# Patient Record
Sex: MALE | Race: WHITE
[De-identification: names, ages, dates, MRNs, and addresses within clinical notes are randomized per-mention and may not be internally consistent; named-entity substitution may affect disease eponyms.]

---

## 2018-02-08 ENCOUNTER — HOSPITAL ENCOUNTER (EMERGENCY)
Dept: HOSPITAL 63 - ER | Age: 63
Discharge: HOME | End: 2018-02-08
Payer: COMMERCIAL

## 2018-02-08 VITALS — BODY MASS INDEX: 31.83 KG/M2 | HEIGHT: 68 IN | WEIGHT: 210 LBS

## 2018-02-08 VITALS — SYSTOLIC BLOOD PRESSURE: 138 MMHG | DIASTOLIC BLOOD PRESSURE: 74 MMHG
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
## 2018-02-08 DIAGNOSIS — F17.210: ICD-10-CM

## 2018-02-08 DIAGNOSIS — M23.91: ICD-10-CM

## 2018-02-08 DIAGNOSIS — E11.9: ICD-10-CM
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
## 2018-02-08 DIAGNOSIS — S89.91XA: Primary | ICD-10-CM

## 2018-02-08 PROCEDURE — 73562 X-RAY EXAM OF KNEE 3: CPT

## 2018-02-08 PROCEDURE — 29505 APPLICATION LONG LEG SPLINT: CPT

## 2018-02-08 PROCEDURE — 96372 THER/PROPH/DIAG INJ SC/IM: CPT

## 2018-02-08 PROCEDURE — 99284 EMERGENCY DEPT VISIT MOD MDM: CPT

## 2018-02-08 NOTE — RAD
Right knee, 3 views, 2/8/2018:



History: Fall, knee pain



No fracture or dislocation is identified.  There is minimal posterior patellar

spurring.  No definite joint effusion is seen.



IMPRESSION: No acute bony abnormality is detected.

## 2018-03-16 ENCOUNTER — HOSPITAL ENCOUNTER (OUTPATIENT)
Dept: HOSPITAL 61 - KCIC MRI | Age: 63
Discharge: HOME | End: 2018-03-16
Attending: GENERAL PRACTICE
Payer: COMMERCIAL

## 2018-03-16 DIAGNOSIS — M22.41: ICD-10-CM
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
## 2018-03-16 DIAGNOSIS — M17.11: ICD-10-CM

## 2018-03-16 DIAGNOSIS — M67.461: ICD-10-CM
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
## 2018-03-16 DIAGNOSIS — M25.461: ICD-10-CM
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
## 2018-03-16 DIAGNOSIS — S83.411A: Primary | ICD-10-CM

## 2018-03-16 PROCEDURE — 73721 MRI JNT OF LWR EXTRE W/O DYE: CPT

## 2018-10-07 ENCOUNTER — HOSPITAL ENCOUNTER (EMERGENCY)
Dept: HOSPITAL 63 - ER | Age: 63
Discharge: HOME | End: 2018-10-07
Payer: COMMERCIAL

## 2018-10-07 VITALS
SYSTOLIC BLOOD PRESSURE: 120 MMHG | DIASTOLIC BLOOD PRESSURE: 75 MMHG | DIASTOLIC BLOOD PRESSURE: 75 MMHG | SYSTOLIC BLOOD PRESSURE: 120 MMHG | DIASTOLIC BLOOD PRESSURE: 75 MMHG | SYSTOLIC BLOOD PRESSURE: 120 MMHG | DIASTOLIC BLOOD PRESSURE: 75 MMHG | SYSTOLIC BLOOD PRESSURE: 120 MMHG

## 2018-10-07 VITALS — HEIGHT: 68 IN | WEIGHT: 210 LBS | BODY MASS INDEX: 31.83 KG/M2

## 2018-10-07 DIAGNOSIS — S23.3XXA: Primary | ICD-10-CM

## 2018-10-07 DIAGNOSIS — E11.9: ICD-10-CM

## 2018-10-07 DIAGNOSIS — S30.0XXA: ICD-10-CM

## 2018-10-07 DIAGNOSIS — Z71.6: ICD-10-CM
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
## 2018-10-07 DIAGNOSIS — S43.401A: ICD-10-CM
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
## 2018-10-07 DIAGNOSIS — M41.84: ICD-10-CM

## 2018-10-07 DIAGNOSIS — M19.011: ICD-10-CM

## 2018-10-07 DIAGNOSIS — S63.501A: ICD-10-CM

## 2018-10-07 DIAGNOSIS — F17.210: ICD-10-CM
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
## 2018-10-07 PROCEDURE — 72072 X-RAY EXAM THORAC SPINE 3VWS: CPT

## 2018-10-07 PROCEDURE — 72125 CT NECK SPINE W/O DYE: CPT

## 2018-10-07 PROCEDURE — 73030 X-RAY EXAM OF SHOULDER: CPT

## 2018-10-07 PROCEDURE — 90715 TDAP VACCINE 7 YRS/> IM: CPT

## 2018-10-07 PROCEDURE — 70450 CT HEAD/BRAIN W/O DYE: CPT

## 2018-10-07 PROCEDURE — 90471 IMMUNIZATION ADMIN: CPT

## 2018-10-07 PROCEDURE — 73110 X-RAY EXAM OF WRIST: CPT

## 2018-10-07 NOTE — RAD
EXAM: Thoracic spine, 3 views.

 

HISTORY: Fall.

 

COMPARISON: None.

 

FINDINGS: Frontal, lateral and swimmer's views of the thoracic spine are 

obtained. There is mild S-shaped thoracic scoliosis. There is no 

listhesis. The vertebral bodies are normal in height and the disc spaces 

are preserved.

 

IMPRESSION: 

1. Mild S-shaped thoracic scoliosis.

2. No acute osseous finding.

 

Electronically signed by: Katie Beasley MD (10/7/2018 12:07 PM) [facility]

## 2018-10-07 NOTE — RAD
EXAM: WRIST 3V RIGHT. 

 

HISTORY: Fall with right wrist pain.

 

COMPARISON: None.

 

FINDINGS: The first metacarpal is shortened, likely developmentally. The 

trapezoid and trapezium appear to been resected. A lucency within the 

distal radius suggests a small intraosseous cyst or prominent vascular 

channel and measures 7 mm. This appears benign. No fractures are 

identified.

 

IMPRESSION: 

1. No fracture or malalignment.

2. Resection of the trapezium and trapezoid. 

 

Electronically signed by: GIRISH Rodriguez MD (10/7/2018 12:09 PM) 

[facility]

## 2018-10-07 NOTE — RAD
EXAM: Right shoulder, 3 views.

 

HISTORY: Fall.

 

COMPARISON: None.

 

FINDINGS: 3 views of the right shoulder obtained. There is no fracture, 

dislocation or subluxation. There are inferiorly directed 

acromioclavicular spurs.

 

IMPRESSION: 

1. No acute osseous finding.

2. 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

 

Electronically signed by: Katie Beasley MD (10/7/2018 10:39 AM) [facility]

## 2018-10-07 NOTE — PHYS DOC
Past History


Past Medical History:  Diabetes


Past Surgical History:  No Surgical History


Smoking:  Cigarettes, Less than 1pk/day


Alcohol Use:  Occasionally


Drug Use:  None





Adult General


Chief Complaint


Chief Complaint:  MECHANICAL FALL





HPI


HPI





Patient is a 62 year old male who presents with complaining of injury to right 

upper extremity and back. Patient is a  and tried to prevent of 

escaping of a inmate with dementia that end up to falling on his right side 

with falling to inmate on top of him. Patient denies loss of consciousness and 

complaining of pain in his back and right shoulder and right wrist with 

abrasion of his back and right hand and rated his pain 8/10. Patient is not up-

to-date with tetanus immunization.





Review of Systems


Review of Systems





Constitutional: Denies fever or chills []


Eyes: Denies change in visual acuity, redness, or eye pain []


HENT: Denies nasal congestion or sore throat []


Respiratory: Denies cough or shortness of breath []


Cardiovascular: No additional information not addressed in HPI []


GI: Denies abdominal pain, nausea, vomiting, bloody stools or diarrhea []


: Denies dysuria or hematuria []


Musculoskeletal: Denies back pain or joint pain []


Integument: Denies rash or skin lesions []


Neurologic: Denies headache, focal weakness or sensory changes []


Endocrine: Denies polyuria or polydipsia []





All other systems were reviewed and found to be within normal limits, except as 

documented in this note.





Current Medications


Current Medications





Current Medications








 Medications


  (Trade)  Dose


 Ordered  Sig/Iwona  Start Time


 Stop Time Status Last Admin


Dose Admin


 


 Acetaminophen/


 Hydrocodone Bitart


  (Lortab 5/325)  1 tab  1X  ONCE  10/7/18 10:45


 10/7/18 10:46 DC  





 


 Diphtheria/


 Tetanus/Acell


 Pertussis


  (Boostrix)  0.5 ml  ONCE ONCE  10/7/18 10:15


 10/7/18 10:28 DC  














Allergies


Allergies





Allergies








Coded Allergies Type Severity Reaction Last Updated Verified


 


  No Known Drug Allergies    18 No











Physical Exam


Physical Exam





Constitutional: Well developed, well nourished, mild distress, non-toxic 

appearance. []


HENT: Normocephalic, atraumatic, oropharynx moist, no oral exudates, nose 

normal. []


Eyes: PERRLA, EOMI, conjunctiva normal, no discharge. [] 


Neck: Normal range of motion, no tenderness, supple, no stridor. [] 


Cardiovascular:Heart rate regular rhythm, no murmur []


Lungs & Thorax:  Bilateral breath sounds clear to auscultation []


Abdomen: Bowel sounds normal, soft, no tenderness, no masses, no pulsatile 

masses. [] 


Skin: Warm, dry, no erythema, no rash. [] 


Back: No tenderness, no CVA tenderness, several areas of abrasion in the lower 

thoracic spine without midline tenderness. [] 


Extremities: Right hand abrasion, right his tenderness, right shoulder limited 

range of motion of abduction secondary to pain, no cyanosis, no clubbing, no 

edema. [] 


Neurologic: Alert and oriented X 3, normal motor function, normal sensory 

function, no focal deficits noted. []


Psychologic: Affect normal, judgement normal, mood normal. []





Current Patient Data


Vital Signs





 Vital Signs








  Date Time  Temp Pulse Resp B/P (MAP) Pulse Ox O2 Delivery O2 Flow Rate FiO2


 


10/7/18 10:06 98.2 110 18  98 Room Air  











EKG


EKG


[]





Radiology/Procedures


Radiology/Procedures


 [facility] [address], [place] [address]


 [phone]


 


 IMAGING REPORT





 Signed





PATIENT: ELIER TEE ACCOUNT: [id number] MRN#: [id number]


: 1955 LOCATION: ER AGE: 62


SEX: M EXAM DT: 10/07/18 ACCESSION#: [id number]


STATUS: REG ER ORD. PHYSICIAN: CASH LUNA MD 


REASON: fall


PROCEDURE: CT HEAD AND CERVICAL SPINE WO





EXAM: Head and cervical spine CT without contrast.


 


HISTORY: Fall.


 


TECHNIQUE: Computed tomographic images of the head and cervical spine were


obtained without contrast. 


*One or more of the following individualized dose reduction techniques 


were utilized for this examination:  


1. Automated exposure control.  


2. Adjustment of the mA and/or kV according to patient size.  


3. Use of iterative reconstruction technique.


 


COMPARISON: None.


 


FINDINGS: 


 


Head: There is no hemorrhage. There is no mass effect or midline shift. 


There is no hydrocephalus. The gray-white matter differentiation pattern 


is intact. There is evidence of lens surgery. The mastoid air cells are 


clear. There is moderate left maxillary and mild bilateral ethmoid sinus 


mucosal thickening. There is a small sphenoid sinus mucus retention cyst. 


No suspicious calvarial lesion is seen.


 


Cervical spine: There is mild anterolisthesis of C7 on T1. There is 


degenerative endplate remodeling with disc space narrowing and 


osteophytosis at C5-C6 and C6-C7. There is facet arthropathy at multiple 


levels. There is no fracture. There is no suspicious osseous lesion.


 


At C2-C3, there is a left paracentral predominant disc bulge and endplate 


remodeling. There is moderate to severe left facet arthropathy. There is 


mild left foraminal stenosis.


 


At C3-C4, there is a disc bulge and endplate remodeling. There is mild 


right and moderate left facet arthropathy. There is mild left foraminal 


stenosis.


 


At C4-C5, there is a right paracentral disc osteophyte complex 


superimposed on a disc bulge and endplate remodeling. There is mild right 


and moderate left facet arthropathy. There is mild right greater than left


foraminal stenosis.


 


At C5-C6, there is a disc bulge and endplate osteophytosis. There is mild 


left facet arthropathy. There is bilateral uncovertebral arthropathy. 


There is moderate to severe right and moderate left foraminal stenosis.


 


At C6-C7, there is a disc bulge and endplate osteophytosis. There is 


bilateral uncovertebral therapy. There is mild to moderate right and 


moderate to severe left foraminal stenosis.


 


IMPRESSION:


1. No acute intracranial finding or evidence of acute cervical spine 


trauma.


2. Multilevel degenerative change involving the cervical spine, described 


above.


 


Electronically signed by: Katie Vargas MD (10/7/2018 10:42 AM) [facility]














DICTATED AND SIGNED BY:     KATIE VARGAS MD


DATE:     10/07/18 1039





CC: EDILIA MAC MD; CASH LUNA MD ~


 [facility] [address], [place] [address]


 [phone]


 


 IMAGING REPORT





 Signed





PATIENT: ELIER TEE ACCOUNT: [id number] MRN#: [id number]


: 1955 LOCATION: ER AGE: 62


SEX: M EXAM DT: 10/07/18 ACCESSION#: [id number]


STATUS: REG ER ORD. PHYSICIAN: CASH LUNA MD 


REASON: injury


PROCEDURE: SHOULDER 2+V RIGHT





EXAM: Right shoulder, 3 views.


 


HISTORY: Fall.


 


COMPARISON: None.


 


FINDINGS: 3 views of the right shoulder obtained. There is no fracture, 


dislocation or subluxation. There are inferiorly directed 


acromioclavicular spurs.


 


IMPRESSION: 


1. No acute osseous finding.


2. 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


 


Electronically signed by: Katie Vargas MD (10/7/2018 10:39 AM) [facility]














DICTATED AND SIGNED BY:     KATIE VARGAS MD


DATE:     10/07/18 1038





CC: EDILIA MAC MD; CASH LUNA MD ~


 [facility] [address], [place], [place] [address] [phone]


 


 IMAGING REPORT





 Signed





PATIENT: ELIER TEE ACCOUNT: [id number] MRN#: [id number]


: 1955 LOCATION: ER AGE: 62


SEX: M EXAM DT: 10/07/18 ACCESSION#: [id number]


STATUS: REG ER ORD. PHYSICIAN: CASH LUNA MD 


REASON: injury


PROCEDURE: THORACIC SPINE 3V





EXAM: Thoracic spine, 3 views.


 


HISTORY: Fall.


 


COMPARISON: None.


 


FINDINGS: Frontal, lateral and swimmer's views of the thoracic spine are 


obtained. There is mild S-shaped thoracic scoliosis. There is no 


listhesis. The vertebral bodies are normal in height and the disc spaces 


are preserved.


 


IMPRESSION: 


1. Mild S-shaped thoracic scoliosis.


2. No acute osseous finding.


 


Electronically signed by: Katie Vargas MD (10/7/2018 12:07 PM) [facility]














DICTATED AND SIGNED BY:     KATIE VARGAS MD


DATE:     10/07/18 120





CC: EDILIA MAC MD; CASH LUNA MD ~


 [facility] [address], [place] [address] [phone]


 


 IMAGING REPORT





 Signed





PATIENT: ELIER TEE ACCOUNT: [id number] MRN#: [id number]


: 1955 LOCATION: ER AGE: 62


SEX: M EXAM DT: 10/07/18 ACCESSION#: [id number]


STATUS: REG ER ORD. PHYSICIAN: CASH LUNA MD 


REASON: injury


PROCEDURE: WRIST 3V RIGHT





EXAM: WRIST 3V RIGHT. 


 


HISTORY: Fall with right wrist pain.


 


COMPARISON: None.


 


FINDINGS: The first metacarpal is shortened, likely developmentally. The 


trapezoid and trapezium appear to been resected. A lucency within the 


distal radius suggests a small intraosseous cyst or prominent vascular 


channel and measures 7 mm. This appears benign. No fractures are 


identified.


 


IMPRESSION: 


1. No fracture or malalignment.


2. Resection of the trapezium and trapezoid. 


 


Electronically signed by: GIRISH Rodriguez MD (10/7/2018 12:09 PM) 


[facility]














DICTATED AND SIGNED BY:     NASIMA RODRIGUEZ MD


DATE:     10/07/18 8945





CC: EDILIA MAC MD; CASH LUNA MD ~





Course & Med Decision Making


Course & Med Decision Making


Pertinent Imaging studies reviewed. (See chart for details)





Evaluation of patient in ER showed 8-year-old male patient injury to upper 

extremity and back after fall at his work. X-ray and CT was unremarkable. 

Patient treated with Norco and felt better. Tetanus immunization updated in ER. 

Plan discharge patient home with diagnosis of contusion and sprain and abrasion.





Dragon Disclaimer


Dragon Disclaimer


This electronic medical record was generated, in whole or in part, using a 

voice recognition dictation system.





Departure


Departure:


Impression:  


 Primary Impression:  


 Contusion of back


 Additional Impressions:  


 Sprain of right shoulder


 Right wrist sprain


 Thoracic sprain


 Tobacco abuse


 Tobacco abuse counseling


Disposition:  01 HOME, SELF-CARE (at 1200)


Condition:  IMPROVED


Referrals:  


EDILIA MAC MD (PCP)


Patient Instructions:  Abrasions, Contusion





Additional Instructions:  


Apply ice on the affected area


Follow-up with your primary care physician in 3-5 days


Return to ER if not getting better


Scripts


Hydrocodone Bit/Acetaminophen (NORCO 5-325 TABLET) 1 Each Tablet


1 TAB PO PRN Q6HRS PRN for PAIN, #14 TAB 0 Refills


   Prov: CASH LUNA MD         10/7/18





Problem Qualifiers











CASH LUNA MD Oct 7, 2018 11:16

## 2018-10-07 NOTE — RAD
EXAM: Head and cervical spine CT without contrast.

 

HISTORY: Fall.

 

TECHNIQUE: Computed tomographic images of the head and cervical spine were

obtained without contrast. 

*One or more of the following individualized dose reduction techniques 

were utilized for this examination:  

1. Automated exposure control.  

2. Adjustment of the mA and/or kV according to patient size.  

3. Use of iterative reconstruction technique.

 

COMPARISON: None.

 

FINDINGS: 

 

Head: There is no hemorrhage. There is no mass effect or midline shift. 

There is no hydrocephalus. The gray-white matter differentiation pattern 

is intact. There is evidence of lens surgery. The mastoid air cells are 

clear. There is moderate left maxillary and mild bilateral ethmoid sinus 

mucosal thickening. There is a small sphenoid sinus mucus retention cyst. 

No suspicious calvarial lesion is seen.

 

Cervical spine: There is mild anterolisthesis of C7 on T1. There is 

degenerative endplate remodeling with disc space narrowing and 

osteophytosis at C5-C6 and C6-C7. There is facet arthropathy at multiple 

levels. There is no fracture. There is no suspicious osseous lesion.

 

At C2-C3, there is a left paracentral predominant disc bulge and endplate 

remodeling. There is moderate to severe left facet arthropathy. There is 

mild left foraminal stenosis.

 

At C3-C4, there is a disc bulge and endplate remodeling. There is mild 

right and moderate left facet arthropathy. There is mild left foraminal 

stenosis.

 

At C4-C5, there is a right paracentral disc osteophyte complex 

superimposed on a disc bulge and endplate remodeling. There is mild right 

and moderate left facet arthropathy. There is mild right greater than left

foraminal stenosis.

 

At C5-C6, there is a disc bulge and endplate osteophytosis. There is mild 

left facet arthropathy. There is bilateral uncovertebral arthropathy. 

There is moderate to severe right and moderate left foraminal stenosis.

 

At C6-C7, there is a disc bulge and endplate osteophytosis. There is 

bilateral uncovertebral therapy. There is mild to moderate right and 

moderate to severe left foraminal stenosis.

 

IMPRESSION:

1. No acute intracranial finding or evidence of acute cervical spine 

trauma.

2. Multilevel degenerative change involving the cervical spine, described 

above.

 

Electronically signed by: Katie Beasley MD (10/7/2018 10:42 AM) [facility]

## 2018-10-11 ENCOUNTER — HOSPITAL ENCOUNTER (EMERGENCY)
Dept: HOSPITAL 61 - ER | Age: 63
Discharge: HOME | End: 2018-10-11
Payer: COMMERCIAL

## 2018-10-11 VITALS — WEIGHT: 208 LBS | BODY MASS INDEX: 30.81 KG/M2 | HEIGHT: 69 IN

## 2018-10-11 VITALS — SYSTOLIC BLOOD PRESSURE: 114 MMHG | DIASTOLIC BLOOD PRESSURE: 79 MMHG

## 2018-10-11 DIAGNOSIS — R06.02: ICD-10-CM

## 2018-10-11 DIAGNOSIS — R00.2: ICD-10-CM

## 2018-10-11 DIAGNOSIS — E11.9: ICD-10-CM

## 2018-10-11 DIAGNOSIS — I47.1: Primary | ICD-10-CM

## 2018-10-11 LAB
ALBUMIN SERPL-MCNC: 3.4 G/DL (ref 3.4–5)
ALBUMIN/GLOB SERPL: 0.9 {RATIO} (ref 1–1.7)
ALP SERPL-CCNC: 62 U/L (ref 46–116)
ALT SERPL-CCNC: 24 U/L (ref 16–63)
ANION GAP SERPL CALC-SCNC: 10 MMOL/L (ref 6–14)
AST SERPL-CCNC: 10 U/L (ref 15–37)
BASOPHILS # BLD AUTO: 0 X10^3/UL (ref 0–0.2)
BASOPHILS NFR BLD: 0 % (ref 0–3)
BILIRUB SERPL-MCNC: 0.3 MG/DL (ref 0.2–1)
BUN SERPL-MCNC: 14 MG/DL (ref 8–26)
BUN/CREAT SERPL: 14 (ref 6–20)
CALCIUM SERPL-MCNC: 9.1 MG/DL (ref 8.5–10.1)
CHLORIDE SERPL-SCNC: 100 MMOL/L (ref 98–107)
CO2 SERPL-SCNC: 26 MMOL/L (ref 21–32)
CREAT SERPL-MCNC: 1 MG/DL (ref 0.7–1.3)
EOSINOPHIL NFR BLD: 0.4 X10^3/UL (ref 0–0.7)
EOSINOPHIL NFR BLD: 5 % (ref 0–3)
ERYTHROCYTE [DISTWIDTH] IN BLOOD BY AUTOMATED COUNT: 14.4 % (ref 11.5–14.5)
GFR SERPLBLD BASED ON 1.73 SQ M-ARVRAT: 76.2 ML/MIN
GLOBULIN SER-MCNC: 4 G/DL (ref 2.2–3.8)
GLUCOSE SERPL-MCNC: 181 MG/DL (ref 70–99)
HCT VFR BLD CALC: 43.2 % (ref 39–53)
HGB BLD-MCNC: 14.9 G/DL (ref 13–17.5)
LYMPHOCYTES # BLD: 2.2 X10^3/UL (ref 1–4.8)
LYMPHOCYTES NFR BLD AUTO: 25 % (ref 24–48)
MAGNESIUM SERPL-MCNC: 1.9 MG/DL (ref 1.8–2.4)
MCH RBC QN AUTO: 29 PG (ref 25–35)
MCHC RBC AUTO-ENTMCNC: 35 G/DL (ref 31–37)
MCV RBC AUTO: 84 FL (ref 79–100)
MONO #: 0.8 X10^3/UL (ref 0–1.1)
MONOCYTES NFR BLD: 9 % (ref 0–9)
NEUT #: 5.5 X10^3UL (ref 1.8–7.7)
NEUTROPHILS NFR BLD AUTO: 61 % (ref 31–73)
PLATELET # BLD AUTO: 306 X10^3/UL (ref 140–400)
POTASSIUM SERPL-SCNC: 4 MMOL/L (ref 3.5–5.1)
PROT SERPL-MCNC: 7.4 G/DL (ref 6.4–8.2)
PROTHROMBIN TIME: 13.4 SEC (ref 11.7–14)
RBC # BLD AUTO: 5.17 X10^6/UL (ref 4.3–5.7)
SODIUM SERPL-SCNC: 136 MMOL/L (ref 136–145)
WBC # BLD AUTO: 9 X10^3/UL (ref 4–11)

## 2018-10-11 PROCEDURE — 84484 ASSAY OF TROPONIN QUANT: CPT

## 2018-10-11 PROCEDURE — 85025 COMPLETE CBC W/AUTO DIFF WBC: CPT

## 2018-10-11 PROCEDURE — 85610 PROTHROMBIN TIME: CPT

## 2018-10-11 PROCEDURE — 84443 ASSAY THYROID STIM HORMONE: CPT

## 2018-10-11 PROCEDURE — 82962 GLUCOSE BLOOD TEST: CPT

## 2018-10-11 PROCEDURE — 99285 EMERGENCY DEPT VISIT HI MDM: CPT

## 2018-10-11 PROCEDURE — 80053 COMPREHEN METABOLIC PANEL: CPT

## 2018-10-11 PROCEDURE — 83735 ASSAY OF MAGNESIUM: CPT

## 2018-10-11 PROCEDURE — 83880 ASSAY OF NATRIURETIC PEPTIDE: CPT

## 2018-10-11 PROCEDURE — 93005 ELECTROCARDIOGRAM TRACING: CPT

## 2018-10-11 PROCEDURE — 36415 COLL VENOUS BLD VENIPUNCTURE: CPT

## 2018-10-11 PROCEDURE — 71045 X-RAY EXAM CHEST 1 VIEW: CPT

## 2018-10-11 NOTE — RAD
PORTABLE CHEST 1V

 

Clinical Indication: CHEST PAIN TODAY,

 

Comparison:  Two-view chest July 3, 2014.

 

Findings: 

Mildly tortuous thoracic aorta. Cardiac size is normal. Lungs are 

hyperexpanded. Lungs are clear. There is no pneumothorax. No pleural 

effusion is appreciated. No acute bone abnormality.

 

IMPRESSION: 

No acute cardiopulmonary process.

 

 

Electronically signed by: Deacon Jones MD (10/11/2018 11:53 AM) [id number]

## 2018-10-11 NOTE — PHYS DOC
Past Medical History


Past Medical History:  Diabetes-Type II


Past Surgical History:  No Surgical History





Adult General


Chief Complaint


Chief Complaint:  RAPID HEART RATE





HPI


HPI





Patient is a 62-year-old male who presents with report of rapid heartbeat that 

started approximately an hour prior to his arrival. Patient states that he was 

getting short of breath and he decided to call EMS. EMS had presented and 

performed EKG and found the patient was in SVT. Patient was given 2 doses of 

Adenocard, first 6 mg then 12 mg after which patient had restoration of a 

normal sinus rhythm. Patient indicates that he has had no chest pain. He states 

that shortness of breath as per the much resolved at this point. He indicates 

that he has had numerous episodes just like this in the past but they had 

always spontaneously resolved. Patient does admit to use of caffeine on a daily 

basis. He states that he has never seen a cardiologist for this.





Review of Systems


Review of Systems





Constitutional: Denies fever or chills []


Respiratory: Denies cough. Admits to dyspnea on exertion.[]


Cardiovascular: Denies chest pain. Complains of palpitations.[]


GI: Denies abdominal pain, nausea, vomiting []


: Denies dysuria or hematuria []


Musculoskeletal: Denies back pain or joint pain []


Integument: Denies rash or skin lesions []


Neurologic: Denies headache, focal weakness or sensory changes []


Endocrine: Denies polyuria or polydipsia []





All other systems were reviewed and found to be within normal limits, except as 

documented in this note.





Current Medications


Current Medications





Current Medications








 Medications


  (Trade)  Dose


 Ordered  Sig/Shona  Start Time


 Stop Time Status Last Admin


Dose Admin


 


 Aspirin


  (Children[facility]


 Aspirin)  324 mg  1X  ONCE  10/11/18 11:30


 10/11/18 12:01 DC 10/11/18 11:30


324 MG


 


 Sodium Chloride  1,000 ml @ 


 1,000 mls/hr  Q1H  10/11/18 11:24


 10/11/18 12:23 DC 10/11/18 11:24


1,000 MLS/HR











Allergies


Allergies





Allergies








Coded Allergies Type Severity Reaction Last Updated Verified


 


  No Known Drug Allergies    10/11/18 No











Physical Exam


Physical Exam





Constitutional: Well developed, well nourished, no acute distress, non-toxic 

appearance. []


HENT: Normocephalic, atraumatic, bilateral external ears normal, oropharynx 

moist, no oral exudates, nose normal. []


Eyes: PERRLA, EOMI, conjunctiva normal, no discharge. [] 


Neck: Normal range of motion, no tenderness, supple, no stridor. [] 


Cardiovascular:Heart rate regular rhythm, no murmur []


Lungs & Thorax:  Bilateral breath sounds clear to auscultation []


Abdomen: Bowel sounds normal, soft, no tenderness, no masses, no pulsatile 

masses. [] 


Skin: Warm, dry, no erythema, no rash. [] 


Back: No tenderness, no CVA tenderness. [] 


Extremities: No tenderness, no cyanosis, no clubbing, ROM intact, no edema. [] 


Neurologic: Alert and oriented X 3, normal motor function, normal sensory 

function, no focal deficits noted. []


Psychologic: Affect normal, judgement normal, mood normal. []





Current Patient Data


Vital Signs





 Vital Signs








  Date Time  Temp Pulse Resp B/P (MAP) Pulse Ox O2 Delivery O2 Flow Rate FiO2


 


10/11/18 14:40  86 20 114/79 (91) 98   


 


10/11/18 11:23 97.6     Nasal Cannula 2.0 





 97.6       








Lab Values





 Laboratory Tests








Test


 10/11/18


11:19 10/11/18


11:20 10/11/18


13:50


 


Glucose (Fingerstick)


 191 mg/dL


(70-99)  H 


 





 


White Blood Count


 


 9.0 x10^3/uL


(4.0-11.0) 





 


Red Blood Count


 


 5.17 x10^6/uL


(4.30-5.70) 





 


Hemoglobin


 


 14.9 g/dL


(13.0-17.5) 





 


Hematocrit


 


 43.2 %


(39.0-53.0) 





 


Mean Corpuscular Volume


 


 84 fL ()


 





 


Mean Corpuscular Hemoglobin  29 pg (25-35)   


 


Mean Corpuscular Hemoglobin


Concent 


 35 g/dL


(31-37) 





 


Red Cell Distribution Width


 


 14.4 %


(11.5-14.5) 





 


Platelet Count


 


 306 x10^3/uL


(140-400) 





 


Neutrophils (%) (Auto)  61 % (31-73)   


 


Lymphocytes (%) (Auto)  25 % (24-48)   


 


Monocytes (%) (Auto)  9 % (0-9)   


 


Eosinophils (%) (Auto)  5 % (0-3)  H 


 


Basophils (%) (Auto)  0 % (0-3)   


 


Neutrophils # (Auto)


 


 5.5 x10^3uL


(1.8-7.7) 





 


Lymphocytes # (Auto)


 


 2.2 x10^3/uL


(1.0-4.8) 





 


Monocytes # (Auto)


 


 0.8 x10^3/uL


(0.0-1.1) 





 


Eosinophils # (Auto)


 


 0.4 x10^3/uL


(0.0-0.7) 





 


Basophils # (Auto)


 


 0.0 x10^3/uL


(0.0-0.2) 





 


Prothrombin Time


 


 13.4 SEC


(11.7-14.0) 





 


Prothrombin Time INR  1.1 (0.8-1.1)   


 


Sodium Level


 


 136 mmol/L


(136-145) 





 


Potassium Level


 


 4.0 mmol/L


(3.5-5.1) 





 


Chloride Level


 


 100 mmol/L


() 





 


Carbon Dioxide Level


 


 26 mmol/L


(21-32) 





 


Anion Gap  10 (6-14)   


 


Blood Urea Nitrogen


 


 14 mg/dL


(8-26) 





 


Creatinine


 


 1.0 mg/dL


(0.7-1.3) 





 


Estimated GFR


(Cockcroft-Gault) 


 76.2  


 





 


BUN/Creatinine Ratio  14 (6-20)   


 


Glucose Level


 


 181 mg/dL


(70-99)  H 





 


Calcium Level


 


 9.1 mg/dL


(8.5-10.1) 





 


Magnesium Level


 


 1.9 mg/dL


(1.8-2.4) 





 


Total Bilirubin


 


 0.3 mg/dL


(0.2-1.0) 





 


Aspartate Amino Transferase


(AST) 


 10 U/L (15-37)


L 





 


Alanine Aminotransferase (ALT)


 


 24 U/L (16-63)


 





 


Alkaline Phosphatase


 


 62 U/L


() 





 


Troponin I Quantitative


 


 < 0.017 ng/mL


(0.000-0.055) < 0.017 ng/mL


(0.000-0.055)


 


NT-Pro-B-Type Natriuretic


Peptide 


 77 pg/mL


(0-124) 





 


Total Protein


 


 7.4 g/dL


(6.4-8.2) 





 


Albumin


 


 3.4 g/dL


(3.4-5.0) 





 


Albumin/Globulin Ratio


 


 0.9 (1.0-1.7)


L 





 


Thyroid Stimulating Hormone


(TSH) 


 5.810 uIU/mL


(0.358-3.74)  H 








 Laboratory Tests


10/11/18 11:20








 Laboratory Tests


10/11/18 11:20














EKG


EKG


[]





Radiology/Procedures


Radiology/Procedures


[]





Course & Med Decision Making


Course & Med Decision Making


Pertinent Labs and Imaging studies reviewed. (See chart for details)





[]





Dragon Disclaimer


Dragon Disclaimer


This electronic medical record was generated, in whole or in part, using a 

voice recognition dictation system.





Departure


Departure


Impression:  


 Primary Impression:  


 Supraventricular tachycardia


Disposition:  01 HOME, SELF-CARE


Condition:  STABLE


Referrals:  


KRISTIE TEJEDA DO (PCP)


Patient Instructions:  Supraventricular Tachycardia





Additional Instructions:  


Follow-up with your primary care provider in the next 1-2 days. I would 

recommend discontinuing use of caffeinated beverages/supplements.











MARYAM LIMON Jr., DO Oct 11, 2018 14:55

## 2018-10-11 NOTE — EKG
[facility]

              [address] [place] [address]

Test Date:    2018-10-11               Test Time:    11:14:12

Pat Name:     DOLLY RICHARDS               Department:   

Patient ID:   [id number]           Room:          

Gender:       M                        Technician:   

:          1955               Requested By: MARYAM LIMON

Order Number: [id number]           Reading MD:   Andreas Thomas MD

                                 Measurements

Intervals                              Axis          

Rate:         110                      P:            59

[place]:           162                      QRS:          -26

QRSD:         70                       T:            40

QT:           310                                    

QTc:          425                                    

                           Interpretive Statements

SINUS TACHYCARDIA

NON-SPECIFIC ST/T CHANGES

Electronically Signed On 10- 8:43:02 CDT by Andreas Thoams MD

## 2020-01-01 NOTE — PHYS DOC
Past History


Past Medical History:  Diabetes


Past Surgical History:  No Surgical History


Smoking:  Cigarettes, Less than 1pk/day


Alcohol Use:  Occasionally


Drug Use:  None





Adult General


Chief Complaint


Chief Complaint:  KNEE INJURY





[facility]


HPI





Patient is a pleasant 62-year-old diabetic male with a knee injury that 

sustained yesterday while at work.  3 weeks ago patient sustained a valgus type 

stress injury like landing on the ground while he was shoveling snow. He was 

seen by his PCP for intrinsic right knee pain that is gotten better. He has 

some difficulty walking at the time there is no injury and even had an MRI 

completed that demonstrated no internal injuries to the knee. Yesterday while 

walking to the skilled nursing he was struck from behind by a heart which caused him to 

fall causing a valgus stress on the knee causing some inflammation and a 

popping sound on the inside of the right knee. Since that time he said some 

instability in the joint and pain with walking prescription is a 7 of 10 of 

achy sharp pain over the medial aspect of the knee. He's noted localized 

swelling and decreased range of motion secondary to pain. He denies any 

numbness and tingling below the injury denies any other injuries.





Review of Systems


Review of Systems





Constitutional: Denies fever or chills []


Eyes: Denies change in visual acuity, redness, or eye pain []


HENT: Denies nasal congestion or sore throat []


Respiratory: Denies cough or shortness of breath []


Cardiovascular: No additional information not addressed in HPI []


GI: Denies abdominal pain, nausea, vomiting, bloody stools or diarrhea []


: Denies dysuria or hematuria []


Musculoskeletal: Denies back pain his main complaint is right knee pain on the 

medial aspect of the knee


Integument: Denies rash or skin lesions []


Neurologic: Denies headache, focal weakness or sensory changes []


Endocrine: Denies polyuria or polydipsia []





All other systems were reviewed and found to be within normal limits, except as 

documented in this note.





Allergies


Allergies





Allergies








Coded Allergies Type Severity Reaction Last Updated Verified


 


  No Known Drug Allergies    18 No











Physical Exam


Physical Exam


Other vital signs on the chart patient brought hypertensive


Constitutional: Well developed, well nourished, no acute distress, non-toxic 

appearance. []


HENT: Normocephalic, atraumatic,


Cardiovascular:Heart rate regular rhythm, no murmur []


Lungs & Thorax:  Bilateral breath sounds clear to auscultation []


Skin: Warm, dry, no erythema, no rash. [] 


Extremities: he has tenderness to palpation over the medial aspect of knee with 

noted swelling to the medial and inferior aspects of the patella there is no 

crepitus along the patella itself is no obvious bony deformity. Patient has an 

negative anterior posterior draw but has a positive lateral stress test over 

the medial collateral ligament with increased pain localized inflammation and 

swelling. Patient with a positive Nico's test as well


Neurologic: Alert and oriented X 3, normal motor function, normal sensory 

function, no focal deficits noted. []


Psychologic: Affect normal, judgement normal, mood normal. []





EKG


EKG


[]





Radiology/Procedures


Radiology/Procedures


[] [facility] [address], [place] [address] [phone]


 


 IMAGING REPORT





 Signed





PATIENT: ELIER TEE ACCOUNT: [id number] MRN#: [id number]


: 1955 LOCATION: ER AGE: 62


SEX: M EXAM DT: 18 ACCESSION#: [id number]


STATUS: PRE ER ORD. PHYSICIAN: ANSELMO SILVEIRA MD 


REASON: trauma


PROCEDURE: KNEE RIGHT 3V





Right knee, 3 views, 2018:





History: Fall, knee pain





No fracture or dislocation is identified.  There is minimal posterior patellar


spurring.  No definite joint effusion is seen.





IMPRESSION: No acute bony abnormality is detected.














DICTATED AND SIGNED BY:     MORITZ,RICK S MD


DATE:     18 8122





CC: ANSELMO SILVEIRA MD; EDILIA MAC MD ~





Course & Med Decision Making


Course & Med Decision Making


Pertinent Labs and Imaging studies reviewed. (See chart for details)





[]Although the patient is recently had x-rays and MRI done of his right knee 

after a fall 3 weeks ago, he sustained a new injury to this right knee and a 

valgus stress with popping sensation of locking sensation within the medial 

collateral ligament and meniscus. I concern based on physical exam findings isn'

t internal ligament injury and now meniscal tear. Patient will be placed in 

knee immobilizer after x-rays are completed he'll be given anti-inflammatory 

and encouraged follow-up with orthopedics.





X-rays negative reviewed by me for signs of fracture or dislocation. 

inFormation passed along to patient. Patient will be referred to surgery.





My discharge plan








Follow up: In addition patient is asked to followup with their primary doctor, 

  within a week for followup examination and to address patient's ongoing 

medical conditions.





Patient is advised that in the Emergency Department primary complaints are 

addressed and only in light of known signs and symptoms.  Patient should return 

immediately to the emergency department if new signs and symptoms develop or 

patient's condition worsens in any way.  At time of discharge patient was in 

stable condition and had verbalized understanding of the discharge instructions.





Although there is no acute fracture noted on x-ray today this does not mean 

subtle fractures are not missed on initial presentation. If your symptoms are 

not improved within 1 week or if symptoms worsen despite oral treatment with 

pain medications I would advise follow-up with your primary care doctor to have 

a repeat set of x-rays completed to ensure no subtle fractures were missed. 

Please understand that sometimes x-rays are missed red and if there is a 

misreading of your x-rays she will be contacted by the emergency room physician 

to talk about appropriate treatment.





Dragon Disclaimer


Dragon Disclaimer


This electronic medical record was generated, in whole or in part, using a 

voice recognition dictation system.





Departure


Departure:


Impression:  


 Primary Impression:  


 Knee injury


 Additional Impression:  


 Internal derangement of knee


Disposition:  01 HOME, SELF-CARE


Condition:  IMPROVED


Referrals:  


EDILIA MAC MD (PCP)


Patient Instructions:  Combined Knee Ligament Sprain-SportsMed, Knee Bracing, 

Knee Effusion, Easy-to-Read, Knee Immobilization





Additional Instructions:  


discharge:





I've spoken with the patient and/or caregivers. I've explained the patient's 

condition, diagnosis and treatment plan based on information available to me at 

this time. I've answered the patient's and/or caregivers questions and 

addressed any concerns. The patient and/or caregivers have a good understanding 

the patient's diagnosis, condition and treatment plan as can be expected at 

this point. Vital signs have been stabilized. The patient's condition is stable 

for discharge from the emergency department.





The patient will pursue further outpatient evaluation with her primary care 

provider or other designated consulting physician as outlined in the discharge 

instructions. Patient and/or caregivers are agreeable to this plan of care and 

follow-up instructions have been explained in detail. The patient and/or 

caregivers have received these instructions in written format and expressed 

understanding of these discharge instructions. The patient and her caregivers 

are aware that if any significant change in condition or worsening of symptoms 

should prompt him to immediately return to this of the closest emergency 

department.  If an emergent department is not readily available I would 

encourage him to call 911.


Scripts


Naproxen Sodium (NAPROXEN SODIUM) 275 Mg Tablet


275 MG PO BID for 7 Days, #14 TAB


   Prov: ANSELMO SILVEIRA MD         18 


Hydrocodone Bit/Acetaminophen (HYDROCODONE-APAP 5-325  **) 1 Each Tablet


1 TAB PO PRN Q6HRS Y for PAIN for 5 Days, #10 TAB 0 Refills


   Prov: ANSELMO SILVEIRA MD         18





Problem Qualifiers











ANSELMO SILVEIRA MD 2018 14:11 Universal Screening Protocol for COVID-19    Writer called Caregiver for Universal Screening Protocol that is in place for COVID-19.     Per patient/patient's Mother, does have negative screen. Please see below.     Mother advised triage nurse, or provider will review and contact Mother for further instructions if needed.     Mother also advised if patient is seen in office, only 1 person is to come with patient to reduce any potential exposures etc. (If under 1 month, up to 2 persons). Advised if they had their own face mask we encourage they wear them, if they don't, we can provide one if needed. Advised to arrive early to have a temperature screening before entering the clinic. Advised if any questions, or if symptoms develop throughout the remainder of the day/night/weekend, to call the office to inform us prior to appointment/arrival to clinic.     Mother verbalized understanding and is agreement with this plan.     1.  Has the patient, or anyone in the household, been in close contact with a person known to have a positive test for COVID-19 in the past 14 days?    No    2. Has the patient or anyone in the household, been tested for COVID-19 in the last 14 days? Is anyone awaiting test results?     No    3.  Has the patient, or anyone in the household, had any travel outside of the state of [place] or [place] in the last 14 days?   No       If YES to travel, how did the patient travel? Where did the patient travel?            4.  Does patient OR patient's household members have any of the following for the last 14 days?:   Temperature/fever (100 or/>), cough, shortness of breath, sore throat, difficulty breathing, chills, repeated shaking with chills, muscle pain, headache, any new loss of smell or taste, nausea, vomiting or diarrhea?   If so, when was the onset of the patient's symptoms?  (If Yes See Below for Symptom List)   No  Who in household has symptom?           Current Patient Symptoms:  Symptom Response   Fever  > 100.F? /Chills/Shaking with Chills?  No   Headache?  No   Runny Nose?   No   Sneezing?   No   Sore Throat?  No   Myalgias? (Achy, Irritable) No   Fatigue? (Sleeping More?) No   Cough?  No   If cough is present, is it dry?  No   Dyspnea? No   Evidence of Respiratory Distress?  No   Nausea, vomiting or diarrhea?  No   Loss of taste or smell?  No

## 2020-03-11 ENCOUNTER — HOSPITAL ENCOUNTER (OUTPATIENT)
Dept: HOSPITAL 63 - DXRAD | Age: 65
Discharge: HOME | End: 2020-03-11
Attending: FAMILY MEDICINE
Payer: COMMERCIAL

## 2020-03-11 DIAGNOSIS — Q25.46: ICD-10-CM

## 2020-03-11 DIAGNOSIS — R05: Primary | ICD-10-CM

## 2020-03-11 DIAGNOSIS — I71.2: ICD-10-CM

## 2020-03-11 DIAGNOSIS — R06.02: ICD-10-CM

## 2020-03-11 PROCEDURE — 71046 X-RAY EXAM CHEST 2 VIEWS: CPT

## 2020-03-11 NOTE — RAD
CHEST PA   LATERAL

 

History: Cough and shortness of breath 

 

Comparison: 7/3/2014 two-view chest x-ray exam.

 

Findings: 

Frontal and lateral views of the chest were obtained. The 

cardiomediastinal silhouette is normal. Pulmonary vasculature is normal. 

The lungs are clear. No pleural effusion or pneumothorax is seen. There is

no acute bone abnormality. Pulmonary hyperinflation is noted. Proximal 

descending thoracic aortic aneurysm is present measuring up to 5.7 cm 

anteroposterior on the lateral view. Notable increase in diameter of the 

proximal descending thoracic aorta is evident since the prior exam. 

Opacity overlying the aorticopulmonary window which likely correlates to 

the aneurysm is evident. Tortuosity of the thoracic aorta is present.

 

IMPRESSION: 

COPD.

 

Proximal descending thoracic aortic aneurysm. Opacity overlying the 

aorticopulmonary window region which probably relates of the aneurysm. 

Further evaluation with CTA of the chest is recommended for further care 

assessment and to exclude a mass.

 

Electronically signed by: Holden Dowell MD (3/11/2020 11:03 AM) [id number]

## 2020-12-22 ENCOUNTER — HOSPITAL ENCOUNTER (OUTPATIENT)
Dept: HOSPITAL 63 - DXRAD | Age: 65
End: 2020-12-22
Payer: COMMERCIAL

## 2020-12-22 DIAGNOSIS — I71.2: Primary | ICD-10-CM

## 2020-12-22 PROCEDURE — 71046 X-RAY EXAM CHEST 2 VIEWS: CPT

## 2020-12-22 NOTE — RAD
Chest radiograph 12/22/2020 8:21 AM



INDICATION: Cardiac thoracic ratio, aneurysm



COMPARISON: 3/11/2020



TECHNIQUE: Frontal and lateral views of the chest are provided.



FINDINGS:



The cardiomediastinal silhouette is within normal limits. Descending thoracic aortic aneurysm measure
s 5.9 cm in AP dimension. Cardiac silhouette to thoracic ratio measures 0.4.



There are no pleural effusions. There is no pulmonary vascular congestion. There is no pneumothorax.



The lungs are clear.



No significant osseous abnormality is identified.



IMPRESSION:



No acute cardiopulmonary process.



Descending thoracic aortic aneurysm measures 5.9 cm.



Electronically signed by: Halle Geller MD (12/22/2020 9:25 AM) [facility]

## 2021-04-06 ENCOUNTER — HOSPITAL ENCOUNTER (OUTPATIENT)
Dept: HOSPITAL 63 - CT | Age: 66
End: 2021-04-06
Attending: SURGERY
Payer: MEDICARE

## 2021-04-06 DIAGNOSIS — E11.65: ICD-10-CM

## 2021-04-06 DIAGNOSIS — I71.4: ICD-10-CM

## 2021-04-06 DIAGNOSIS — I71.2: Primary | ICD-10-CM

## 2021-04-06 DIAGNOSIS — N28.1: ICD-10-CM

## 2021-04-06 LAB
ALBUMIN SERPL-MCNC: 4 G/DL (ref 3.4–5)
ANION GAP SERPL CALC-SCNC: 10 MMOL/L (ref 6–14)
CA-I SERPL ISE-MCNC: 17 MG/DL (ref 8–26)
CALCIUM SERPL-MCNC: 8.8 MG/DL (ref 8.5–10.1)
CHLORIDE SERPL-SCNC: 105 MMOL/L (ref 98–107)
CO2 SERPL-SCNC: 26 MMOL/L (ref 21–32)
CREAT SERPL-MCNC: 0.9 MG/DL (ref 0.7–1.3)
GFR SERPLBLD BASED ON 1.73 SQ M-ARVRAT: 85.2 ML/MIN
GLUCOSE SERPL-MCNC: 99 MG/DL (ref 70–99)
PHOSPHATE SERPL-MCNC: 2.9 MG/DL (ref 2.6–4.7)
POTASSIUM SERPL-SCNC: 4.1 MMOL/L (ref 3.5–5.1)
SODIUM SERPL-SCNC: 141 MMOL/L (ref 136–145)

## 2021-04-06 PROCEDURE — 74177 CT ABD & PELVIS W/CONTRAST: CPT

## 2021-04-06 PROCEDURE — 80069 RENAL FUNCTION PANEL: CPT

## 2021-04-06 PROCEDURE — 36415 COLL VENOUS BLD VENIPUNCTURE: CPT

## 2021-04-06 PROCEDURE — 71275 CT ANGIOGRAPHY CHEST: CPT

## 2021-04-06 NOTE — RAD
CT angiography of the chest, abdomen, and pelvis 4/6/2021



INDICATION: Thoracic aortic aneurysm



COMPARISON STUDY: PA and lateral chest radiograph December 22, 2020.



TECHNIQUE: Multidetector CT imaging of the chest, abdomen, and pelvis was performed. Chest imaging wa
s performed with and without contrast. Abdominal pelvic imaging was performed following the administr
ation of contrast. 3-D reconstructions of thoracic, abdominal, and pelvic vasculature were created on
 an independent workstation and reviewed.



FINDINGS: Neither the thoracic or abdominal aorta demonstrate evidence of dissection. Motion is seen 
predominantly involving the ascending thoracic aorta. There is a "bovine" configuration of the thorac
ic aortic arch. At the sinotubular junction the thoracic aorta measures 2.6 cm. At the level the main
 pulmonary artery the thoracic aorta measures 3 cm. The level of the left subclavian artery the thora
cic aorta measures 2.5 cm. The descending thoracic aorta is significantly irregular in contour with m
ultiple areas of soft plaque. Calcification is minimal. The maximal diameter of the descending thorac
ic aorta is approximately 4.9 cm. Irregularity continues throughout the descending thoracic aorta jus
t above the diaphragm the thoracic aorta returns to normal caliber measuring 2.8 cm in diameter. The 
abdominal aorta demonstrates no aneurysm or dissection. Celiac artery, SMA, bilateral renal arteries,
 and DEER are patent. The bilateral common, external, and internal iliac arteries are patent. The bila
teral common femoral arteries are patent.



Heart size is normal. No pericardial effusion is identified. No pathologically enlarged mediastinal a
denopathy is seen. There is no pneumothorax. There is no pleural effusion. No focal infiltrates are s
een. Solid viscera of the abdomen demonstrate no acute abnormality. 2.9 cm left renal cyst noted. The
re is no bowel obstruction. Appendix is unremarkable. Bladder is unremarkable. No free fluid or free 
air seen in the abdomen or pelvis. No acute osseous changes are seen.

 

IMPRESSION:  Descending thoracic aortic aneurysm measuring up to 4.9 cm in diameter. There is no abdo
andrew aortic aneurysm. There is no aortic dissection.

 



CT DOSING PQRS STATEMENT: 

One or more of the following individualized dose reduction techniques were utilized for this examinat
ion: 

 1. Automated exposure control 

 2. Adjustment of the mA and/or kV according to patient size  

3. Use of iterative reconstruction technique

 



Electronically signed by: Eitan Clark MD (4/6/2021 10:17 AM) [id number]